# Patient Record
(demographics unavailable — no encounter records)

---

## 2024-10-16 NOTE — PHYSICAL EXAM
[No Acute Distress] : no acute distress [No JVD] : no jugular venous distention [No Lymphadenopathy] : no lymphadenopathy [Supple] : supple [No Respiratory Distress] : no respiratory distress  [Clear to Auscultation] : lungs were clear to auscultation bilaterally [Normal Rate] : normal rate  [Regular Rhythm] : with a regular rhythm [Normal S1, S2] : normal S1 and S2 [No Carotid Bruits] : no carotid bruits [No Edema] : there was no peripheral edema [Soft] : abdomen soft [Non Tender] : non-tender [Normal Bowel Sounds] : normal bowel sounds [Normal Gait] : normal gait [Alert and Oriented x3] : oriented to person, place, and time
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
## 2024-12-06 NOTE — PHYSICAL EXAM
[No Acute Distress] : no acute distress [No Respiratory Distress] : no respiratory distress  [No Accessory Muscle Use] : no accessory muscle use [Clear to Auscultation] : lungs were clear to auscultation bilaterally [Normal Rate] : normal rate  [Regular Rhythm] : with a regular rhythm [Normal S1, S2] : normal S1 and S2 [No Edema] : there was no peripheral edema [Soft] : abdomen soft [Non Tender] : non-tender [Non-distended] : non-distended [Normal Bowel Sounds] : normal bowel sounds [Normal Gait] : normal gait [Alert and Oriented x3] : oriented to person, place, and time
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
## 2024-12-20 NOTE — PHYSICAL EXAM
[No Acute Distress] : no acute distress [No Respiratory Distress] : no respiratory distress  [Normal Rate] : normal rate  [Regular Rhythm] : with a regular rhythm [Normal S1, S2] : normal S1 and S2 [No Focal Deficits] : no focal deficits [Alert and Oriented x3] : oriented to person, place, and time

## 2024-12-20 NOTE — HISTORY OF PRESENT ILLNESS
[FreeTextEntry1] : HTN, elevated BG [de-identified] : Was seen 2 weeks ago.  BP was elevated. Lisinopril was increased to 20 mg daily.  Here today for repeat BP check.    Labs were completed.  A1c 6.2%.  Patient has cut back on bread and is working on eating smaller portions of rice and beans.  He does not snack.  No sugar in his coffee.

## 2025-03-05 NOTE — HISTORY OF PRESENT ILLNESS
[FreeTextEntry1] : Follow up preDM [de-identified] : Saw cardiologist, Dr. Rand, end of Feb.  No changes in medications.   Following up on A1c, last 6.2%.  Has not been able to get to the gym but has been doing exercises at home and walking more.

## 2025-04-23 NOTE — REVIEW OF SYSTEMS
[Fatigue] : fatigue [Hearing Loss] : hearing loss [Postnasal Drip] : postnasal drip [Nocturia] : nocturia [Negative] : Heme/Lymph

## 2025-04-23 NOTE — PHYSICAL EXAM
[No Acute Distress] : no acute distress [Normal Sclera/Conjunctiva] : normal sclera/conjunctiva [PERRL] : pupils equal round and reactive to light [EOMI] : extraocular movements intact [Normal Outer Ear/Nose] : the outer ears and nose were normal in appearance [Normal Oropharynx] : the oropharynx was normal [No JVD] : no jugular venous distention [No Lymphadenopathy] : no lymphadenopathy [Supple] : supple [No Respiratory Distress] : no respiratory distress  [No Accessory Muscle Use] : no accessory muscle use [Clear to Auscultation] : lungs were clear to auscultation bilaterally [Normal Rate] : normal rate  [Regular Rhythm] : with a regular rhythm [Normal S1, S2] : normal S1 and S2 [No Edema] : there was no peripheral edema [Soft] : abdomen soft [Non Tender] : non-tender [Non-distended] : non-distended [Normal Bowel Sounds] : normal bowel sounds [Normal Supraclavicular Nodes] : no supraclavicular lymphadenopathy [Normal Posterior Cervical Nodes] : no posterior cervical lymphadenopathy [Normal Anterior Cervical Nodes] : no anterior cervical lymphadenopathy [No Spinal Tenderness] : no spinal tenderness [Grossly Normal Strength/Tone] : grossly normal strength/tone [No Rash] : no rash [No Focal Deficits] : no focal deficits [Alert and Oriented x3] : oriented to person, place, and time [Normal Mood] : the mood was normal [Normal Insight/Judgement] : insight and judgment were intact

## 2025-04-27 NOTE — HEALTH RISK ASSESSMENT
[One fall no injury in past year] : Patient reported one fall in the past year without injury [0] : 2) Feeling down, depressed, or hopeless: Not at all (0) [PHQ-2 Negative - No further assessment needed] : PHQ-2 Negative - No further assessment needed [No] : does not take [None] : Patient does not have any barriers to medication adherence [Never] : Never [NO] : No [With Significant Other] : lives with significant other [Employed] : employed [] :  [# Of Children ___] : has [unfilled] children [Feels Safe at Home] : Feels safe at home [Fully functional (bathing, dressing, toileting, transferring, walking, feeding)] : Fully functional (bathing, dressing, toileting, transferring, walking, feeding) [Fully functional (using the telephone, shopping, preparing meals, housekeeping, doing laundry, using] : Fully functional and needs no help or supervision to perform IADLs (using the telephone, shopping, preparing meals, housekeeping, doing laundry, using transportation, managing medications and managing finances) [Reports changes in hearing] : Reports changes in hearing [Smoke Detector] : smoke detector [Carbon Monoxide Detector] : carbon monoxide detector [Seat Belt] :  uses seat belt [de-identified] : Cardiology, Neurology [[id number]] : 0 [Change in mental status noted] : No change in mental status noted [Sexually Active] : not sexually active [Reports changes in vision] : Reports no changes in vision [Reports changes in dental health] : Reports no changes in dental health [FreeTextEntry2] :  [FreeTextEntry3] : 1 son passed away

## 2025-04-27 NOTE — HISTORY OF PRESENT ILLNESS
[FreeTextEntry1] : EDWARD [de-identified] : 85 year old male with a history of CAD s/P PCI stent in 98 and 07, hearing loss, bilateral hand tremor, HLD who presents today for a wellness visit.   Saw cardiologist, Dr. Rand, end of Feb.  No changes in medications.   Saw neurologist, Dr. Hutton in [place], in March.  Tremor has remained stable.  Also mentioned issues of feeling off balanced.  His gait was monitor and no major issues were identified as per pt.  Notices feeling off balanced when he first gets up and starts to walk.  Has not issues once he gets started.  Denies feelings of dizziness.   Last A1c was 6.2%.  Started on metformin, tolerating well.  Complains of feeling tired.  Has been busy with his [employer]. He is sleeping well, goes to bed 10-1030P, wakes around 730A. He does have nocturia 2-3 times but is able to go back to sleep without issues.

## 2025-04-27 NOTE — HISTORY OF PRESENT ILLNESS
[FreeTextEntry1] : EDWARD [de-identified] : 85 year old male with a history of CAD s/P PCI stent in 98 and 07, hearing loss, bilateral hand tremor, HLD who presents today for a wellness visit.   Saw cardiologist, Dr. Rand, end of Feb.  No changes in medications.   Saw neurologist, Dr. Hutton in [place], in March.  Tremor has remained stable.  Also mentioned issues of feeling off balanced.  His gait was monitor and no major issues were identified as per pt.  Notices feeling off balanced when he first gets up and starts to walk.  Has not issues once he gets started.  Denies feelings of dizziness.   Last A1c was 6.2%.  Started on metformin, tolerating well.  Complains of feeling tired.  Has been busy with his [employer]. He is sleeping well, goes to bed 10-1030P, wakes around 730A. He does have nocturia 2-3 times but is able to go back to sleep without issues.

## 2025-04-27 NOTE — HEALTH RISK ASSESSMENT
[One fall no injury in past year] : Patient reported one fall in the past year without injury [0] : 2) Feeling down, depressed, or hopeless: Not at all (0) [PHQ-2 Negative - No further assessment needed] : PHQ-2 Negative - No further assessment needed [No] : does not take [None] : Patient does not have any barriers to medication adherence [Never] : Never [NO] : No [With Significant Other] : lives with significant other [Employed] : employed [] :  [# Of Children ___] : has [unfilled] children [Feels Safe at Home] : Feels safe at home [Fully functional (bathing, dressing, toileting, transferring, walking, feeding)] : Fully functional (bathing, dressing, toileting, transferring, walking, feeding) [Fully functional (using the telephone, shopping, preparing meals, housekeeping, doing laundry, using] : Fully functional and needs no help or supervision to perform IADLs (using the telephone, shopping, preparing meals, housekeeping, doing laundry, using transportation, managing medications and managing finances) [Reports changes in hearing] : Reports changes in hearing [Smoke Detector] : smoke detector [Carbon Monoxide Detector] : carbon monoxide detector [Seat Belt] :  uses seat belt [de-identified] : Cardiology, Neurology [[id number]] : 0 [Change in mental status noted] : No change in mental status noted [Sexually Active] : not sexually active [Reports changes in vision] : Reports no changes in vision [Reports changes in dental health] : Reports no changes in dental health [FreeTextEntry2] :  [FreeTextEntry3] : 1 son passed away
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
## 2025-07-03 NOTE — ASSESSMENT
[FreeTextEntry1] : 85M CAD s/p PCI 1998 and 2007, nuclear stress 2021 with basal inferolateral infarct and normal EF, TTE March 2024 with no significant findings, HLD here to establish care.    EKG today for CAD - sinus rhythm no st-t changes LDL 84, HDL 78, TG 70 Hba1c 6.2%  Reviewed prior records.  - no signs of acute ischemia or heart failure.   - advise stopping plavix, he is been on long term DAPT, he is at an advanced age and has a remote hx of PCI.  Very stable CAD and elevated bleeding risk.  Continue aspirin 81mg indefinitely.   - LDL not at goal, rec trial of crestor 20mg in place of simvastatin.  Continue zetia 10mg daily - continue lisinopril 20mg and toprol 25mg for HTN, well controlled.